# Patient Record
Sex: FEMALE | Race: WHITE | NOT HISPANIC OR LATINO | Employment: UNEMPLOYED | URBAN - METROPOLITAN AREA
[De-identification: names, ages, dates, MRNs, and addresses within clinical notes are randomized per-mention and may not be internally consistent; named-entity substitution may affect disease eponyms.]

---

## 2017-05-30 PROBLEM — N92.6 IRREGULAR MENSTRUATION: Status: ACTIVE | Noted: 2017-05-30

## 2017-12-14 PROBLEM — E78.1 HIGH BLOOD TRIGLYCERIDES: Status: ACTIVE | Noted: 2017-12-14

## 2018-04-11 PROBLEM — E55.9 VITAMIN D DEFICIENCY: Chronic | Status: ACTIVE | Noted: 2018-04-11

## 2023-05-08 DIAGNOSIS — R52 PAIN: Primary | ICD-10-CM

## 2023-05-09 ENCOUNTER — OFFICE VISIT (OUTPATIENT)
Dept: ORTHOPEDICS | Facility: CLINIC | Age: 43
End: 2023-05-09
Payer: MEDICAID

## 2023-05-09 ENCOUNTER — HOSPITAL ENCOUNTER (OUTPATIENT)
Dept: RADIOLOGY | Facility: HOSPITAL | Age: 43
Discharge: HOME | End: 2023-05-09
Attending: ORTHOPAEDIC SURGERY
Payer: MEDICAID

## 2023-05-09 VITALS
WEIGHT: 191.81 LBS | HEIGHT: 62 IN | DIASTOLIC BLOOD PRESSURE: 82 MMHG | BODY MASS INDEX: 35.3 KG/M2 | SYSTOLIC BLOOD PRESSURE: 121 MMHG | HEART RATE: 87 BPM

## 2023-05-09 DIAGNOSIS — G89.29 CHRONIC PAIN OF RIGHT KNEE: Primary | ICD-10-CM

## 2023-05-09 DIAGNOSIS — R52 PAIN: ICD-10-CM

## 2023-05-09 DIAGNOSIS — M25.561 CHRONIC PAIN OF RIGHT KNEE: Primary | ICD-10-CM

## 2023-05-09 PROCEDURE — 1159F MED LIST DOCD IN RCRD: CPT | Mod: CPTII,,, | Performed by: PHYSICIAN ASSISTANT

## 2023-05-09 PROCEDURE — 99203 OFFICE O/P NEW LOW 30 MIN: CPT | Mod: S$PBB,,, | Performed by: PHYSICIAN ASSISTANT

## 2023-05-09 PROCEDURE — 3079F DIAST BP 80-89 MM HG: CPT | Mod: CPTII,,, | Performed by: PHYSICIAN ASSISTANT

## 2023-05-09 PROCEDURE — 3074F SYST BP LT 130 MM HG: CPT | Mod: CPTII,,, | Performed by: PHYSICIAN ASSISTANT

## 2023-05-09 PROCEDURE — 73564 X-RAY EXAM KNEE 4 OR MORE: CPT | Mod: 26,RT,, | Performed by: RADIOLOGY

## 2023-05-09 PROCEDURE — 3008F PR BODY MASS INDEX (BMI) DOCUMENTED: ICD-10-PCS | Mod: CPTII,,, | Performed by: PHYSICIAN ASSISTANT

## 2023-05-09 PROCEDURE — 99213 OFFICE O/P EST LOW 20 MIN: CPT | Mod: PBBFAC,PN | Performed by: PHYSICIAN ASSISTANT

## 2023-05-09 PROCEDURE — 3008F BODY MASS INDEX DOCD: CPT | Mod: CPTII,,, | Performed by: PHYSICIAN ASSISTANT

## 2023-05-09 PROCEDURE — 99999 PR PBB SHADOW E&M-EST. PATIENT-LVL III: ICD-10-PCS | Mod: PBBFAC,,, | Performed by: PHYSICIAN ASSISTANT

## 2023-05-09 PROCEDURE — 73564 X-RAY EXAM KNEE 4 OR MORE: CPT | Mod: TC,PN,RT

## 2023-05-09 PROCEDURE — 99203 PR OFFICE/OUTPT VISIT, NEW, LEVL III, 30-44 MIN: ICD-10-PCS | Mod: S$PBB,,, | Performed by: PHYSICIAN ASSISTANT

## 2023-05-09 PROCEDURE — 3074F PR MOST RECENT SYSTOLIC BLOOD PRESSURE < 130 MM HG: ICD-10-PCS | Mod: CPTII,,, | Performed by: PHYSICIAN ASSISTANT

## 2023-05-09 PROCEDURE — 99999 PR PBB SHADOW E&M-EST. PATIENT-LVL III: CPT | Mod: PBBFAC,,, | Performed by: PHYSICIAN ASSISTANT

## 2023-05-09 PROCEDURE — 73564 XR KNEE COMP 4 OR MORE VIEWS RIGHT: ICD-10-PCS | Mod: 26,RT,, | Performed by: RADIOLOGY

## 2023-05-09 PROCEDURE — 1159F PR MEDICATION LIST DOCUMENTED IN MEDICAL RECORD: ICD-10-PCS | Mod: CPTII,,, | Performed by: PHYSICIAN ASSISTANT

## 2023-05-09 PROCEDURE — 3079F PR MOST RECENT DIASTOLIC BLOOD PRESSURE 80-89 MM HG: ICD-10-PCS | Mod: CPTII,,, | Performed by: PHYSICIAN ASSISTANT

## 2023-05-09 PROCEDURE — 1160F PR REVIEW ALL MEDS BY PRESCRIBER/CLIN PHARMACIST DOCUMENTED: ICD-10-PCS | Mod: CPTII,,, | Performed by: PHYSICIAN ASSISTANT

## 2023-05-09 PROCEDURE — 1160F RVW MEDS BY RX/DR IN RCRD: CPT | Mod: CPTII,,, | Performed by: PHYSICIAN ASSISTANT

## 2023-05-09 NOTE — PROGRESS NOTES
Subjective:      Patient ID: Marycarmen Valencia is a 42 y.o. female.    Chief Complaint: Pain of the Right Knee      42year old female presents with couple year history of right knee pain. No injury. Pain is medially and posteriorly. Worse with flexion, sleeping, driving and walking. Notes popping and locking sensation. Has tried  tylenol and nsaids without any relief. Had corticosteroid injections in the past which only helped for about 3 mon. Has not tried formal PT. States her PCP ordered a MRI that showed a meniscus tear. She does not have a disc or MRI report with her today.       Review of Systems   Constitutional: Negative for chills and fever.   Cardiovascular:  Negative for chest pain.   Respiratory:  Negative for cough.    Hematologic/Lymphatic: Does not bruise/bleed easily.   Skin:  Negative for poor wound healing and rash.   Musculoskeletal:  Positive for joint pain, myalgias and stiffness.   Gastrointestinal:  Negative for abdominal pain.   Genitourinary:  Negative for bladder incontinence.   Neurological:  Negative for dizziness, loss of balance and weakness.   Psychiatric/Behavioral:  Negative for altered mental status.      Review of patient's allergies indicates:   Allergen Reactions    Amoxicillin Other (See Comments)        Current Outpatient Medications   Medication Sig Dispense Refill    cabergoline (DOSTINEX) 0.5 mg tablet TAKE 1/2 TABLET(0.25 MG TOTAL) BY MOUTH TWICE A WEEK 8 tablet 0    levothyroxine (SYNTHROID) 112 MCG tablet Take 2 tablets (224 mcg total) by mouth before breakfast. This is a dose decrease from 250 mcg po qam. Patient requests SULLY 60 tablet 11    omega 3-dha-epa-fish oil (FISH OIL) 1,000 mg (120 mg-180 mg) Cap Take 2 pills twice a day of 1000 mg each.      venlafaxine (EFFEXOR) 75 MG tablet Take 2 tablets (150 mg total) by mouth Daily. 90 tablet 3     No current facility-administered medications for this visit.        The patient's relevant past medical, surgical,  "and social history was reviewed in Epic.       Objective:      VITAL SIGNS: /82   Pulse 87   Ht 5' 2" (1.575 m)   Wt 87 kg (191 lb 12.8 oz)   BMI 35.08 kg/m²     General    Nursing note and vitals reviewed.  Constitutional: She is oriented to person, place, and time. She appears well-developed and well-nourished.   Neurological: She is alert and oriented to person, place, and time.     General Musculoskeletal Exam   Gait: normal       Right Knee Exam     Tenderness   The patient is tender to palpation of the medial joint line.    Range of Motion   Extension:  5   Flexion:  130     Tests   Meniscus   Carissa:  Medial - negative   Ligament Examination   Lachman: normal (-1 to 2mm)   MCL - Valgus: normal (0 to 2mm)  LCL - Varus: normal    Other   Sensation: normal    Left Knee Exam     Range of Motion   Extension:  5   Flexion:  130     Muscle Strength   Right Lower Extremity   Quadriceps:  4/5   Left Lower Extremity   Quadriceps:  4/5      Radiographs taken today of the right knee and reviewed by me demonstrate no acute fractures or dislocations. There is mild degenerative changes noted in the patellofemoral compartment. There is no soft tissue swelling.       Assessment:       1. Chronic pain of right knee          Plan:         Marycarmen was seen today for pain.    Diagnoses and all orders for this visit:    Chronic pain of right knee  -     Ambulatory referral/consult to Physical/Occupational Therapy; Future    Right knee pain, possible meniscus tear. Does not have MRI with her today. I recommend trying a complete formal course of PT first. If no relief, will have her come back to follow up with sports med physician after PT is complete. Bring the MRI disc and report. Continue OTC meds as needed for pain.     Diagnoses and plan discussed with the patient, as well as the expected course and duration of his symptoms.  All questions and concerns were addressed prior to the end of the visit.   Instructed " patient to call office if they have any future questions/concerns or to schedule apt. Patient will return to see me if symptoms worsen or fail to improve    Note dictated with voice recognition software, please excuse any grammatical errors.        Avelina Worthington PA-C   05/09/2023

## 2023-08-22 ENCOUNTER — PATIENT MESSAGE (OUTPATIENT)
Dept: ORTHOPEDICS | Facility: CLINIC | Age: 43
End: 2023-08-22
Payer: COMMERCIAL

## 2023-09-08 ENCOUNTER — OFFICE VISIT (OUTPATIENT)
Dept: ORTHOPEDICS | Facility: CLINIC | Age: 43
End: 2023-09-08
Payer: COMMERCIAL

## 2023-09-08 ENCOUNTER — TELEPHONE (OUTPATIENT)
Dept: ORTHOPEDICS | Facility: CLINIC | Age: 43
End: 2023-09-08
Payer: COMMERCIAL

## 2023-09-08 VITALS
HEIGHT: 62 IN | HEART RATE: 80 BPM | SYSTOLIC BLOOD PRESSURE: 126 MMHG | DIASTOLIC BLOOD PRESSURE: 82 MMHG | WEIGHT: 203.94 LBS | BODY MASS INDEX: 37.53 KG/M2

## 2023-09-08 DIAGNOSIS — M25.561 ACUTE PAIN OF RIGHT KNEE: ICD-10-CM

## 2023-09-08 DIAGNOSIS — M17.11 PATELLOFEMORAL ARTHRITIS OF RIGHT KNEE: Primary | ICD-10-CM

## 2023-09-08 PROCEDURE — 99213 OFFICE O/P EST LOW 20 MIN: CPT | Mod: S$GLB,,, | Performed by: PHYSICIAN ASSISTANT

## 2023-09-08 PROCEDURE — 3008F BODY MASS INDEX DOCD: CPT | Mod: CPTII,S$GLB,, | Performed by: PHYSICIAN ASSISTANT

## 2023-09-08 PROCEDURE — 3079F DIAST BP 80-89 MM HG: CPT | Mod: CPTII,S$GLB,, | Performed by: PHYSICIAN ASSISTANT

## 2023-09-08 PROCEDURE — 99999 PR PBB SHADOW E&M-EST. PATIENT-LVL IV: CPT | Mod: PBBFAC,,, | Performed by: PHYSICIAN ASSISTANT

## 2023-09-08 PROCEDURE — 99999 PR PBB SHADOW E&M-EST. PATIENT-LVL IV: ICD-10-PCS | Mod: PBBFAC,,, | Performed by: PHYSICIAN ASSISTANT

## 2023-09-08 PROCEDURE — 3008F PR BODY MASS INDEX (BMI) DOCUMENTED: ICD-10-PCS | Mod: CPTII,S$GLB,, | Performed by: PHYSICIAN ASSISTANT

## 2023-09-08 PROCEDURE — 1159F MED LIST DOCD IN RCRD: CPT | Mod: CPTII,S$GLB,, | Performed by: PHYSICIAN ASSISTANT

## 2023-09-08 PROCEDURE — 3079F PR MOST RECENT DIASTOLIC BLOOD PRESSURE 80-89 MM HG: ICD-10-PCS | Mod: CPTII,S$GLB,, | Performed by: PHYSICIAN ASSISTANT

## 2023-09-08 PROCEDURE — 3074F PR MOST RECENT SYSTOLIC BLOOD PRESSURE < 130 MM HG: ICD-10-PCS | Mod: CPTII,S$GLB,, | Performed by: PHYSICIAN ASSISTANT

## 2023-09-08 PROCEDURE — 99213 PR OFFICE/OUTPT VISIT, EST, LEVL III, 20-29 MIN: ICD-10-PCS | Mod: S$GLB,,, | Performed by: PHYSICIAN ASSISTANT

## 2023-09-08 PROCEDURE — 1160F RVW MEDS BY RX/DR IN RCRD: CPT | Mod: CPTII,S$GLB,, | Performed by: PHYSICIAN ASSISTANT

## 2023-09-08 PROCEDURE — 3074F SYST BP LT 130 MM HG: CPT | Mod: CPTII,S$GLB,, | Performed by: PHYSICIAN ASSISTANT

## 2023-09-08 PROCEDURE — 1159F PR MEDICATION LIST DOCUMENTED IN MEDICAL RECORD: ICD-10-PCS | Mod: CPTII,S$GLB,, | Performed by: PHYSICIAN ASSISTANT

## 2023-09-08 PROCEDURE — 1160F PR REVIEW ALL MEDS BY PRESCRIBER/CLIN PHARMACIST DOCUMENTED: ICD-10-PCS | Mod: CPTII,S$GLB,, | Performed by: PHYSICIAN ASSISTANT

## 2023-09-08 RX ORDER — MELOXICAM 7.5 MG/1
7.5 TABLET ORAL DAILY
Qty: 30 TABLET | Refills: 2 | Status: SHIPPED | OUTPATIENT
Start: 2023-09-08

## 2023-09-08 NOTE — TELEPHONE ENCOUNTER
Per HW, this patient had MRI done elsewhere when I saw her in may. she did not have report or disc with her. I cannot see her today if she does not have the report or disc Left voice mail to bring in CD and report today.

## 2023-09-08 NOTE — PROGRESS NOTES
"Subjective:      Patient ID: Marycarmen Valencia is a 43 y.o. female.    Chief Complaint: Pain of the Right Knee      43-year-old female follow-up right knee pain.  Patient has her MRI today and report.  She did not go to physical therapy due to work schedule.  She states her pain is around the patella and medially.  Notes popping sensation.  Unable to kneel.        Review of Systems   Constitutional: Negative for chills and fever.   Cardiovascular:  Negative for chest pain.   Respiratory:  Negative for cough.    Hematologic/Lymphatic: Does not bruise/bleed easily.   Skin:  Negative for poor wound healing and rash.   Musculoskeletal:  Positive for joint pain, myalgias and stiffness.   Gastrointestinal:  Negative for abdominal pain.   Genitourinary:  Negative for bladder incontinence.   Neurological:  Negative for dizziness, loss of balance and weakness.   Psychiatric/Behavioral:  Negative for altered mental status.        Review of patient's allergies indicates:   Allergen Reactions    Amoxicillin Other (See Comments)        Current Outpatient Medications   Medication Sig Dispense Refill    cabergoline (DOSTINEX) 0.5 mg tablet TAKE 1/2 TABLET(0.25 MG TOTAL) BY MOUTH TWICE A WEEK 8 tablet 0    levothyroxine (SYNTHROID) 112 MCG tablet Take 2 tablets (224 mcg total) by mouth before breakfast. This is a dose decrease from 250 mcg po qam. Patient requests SULLY 60 tablet 11    meloxicam (MOBIC) 7.5 MG tablet Take 1 tablet (7.5 mg total) by mouth once daily. 30 tablet 2    omega 3-dha-epa-fish oil (FISH OIL) 1,000 mg (120 mg-180 mg) Cap Take 2 pills twice a day of 1000 mg each.      venlafaxine (EFFEXOR) 75 MG tablet Take 2 tablets (150 mg total) by mouth Daily. 90 tablet 3     No current facility-administered medications for this visit.        The patient's relevant past medical, surgical, and social history was reviewed in Epic.       Objective:      VITAL SIGNS: /82   Pulse 80   Ht 5' 2" (1.575 m)   Wt " 92.5 kg (203 lb 14.8 oz)   BMI 37.30 kg/m²               Right Knee Exam     Tenderness   The patient is tender to palpation of the patella and medial joint line.    Range of Motion   Extension:  5   Flexion:  130     Tests   Meniscus   Carissa:  Medial - negative   Ligament Examination   Lachman: normal (-1 to 2mm)     Muscle Strength   Right Lower Extremity   Quadriceps:  4/5      Right knee MRI done on 7/26/22 reveals  1. Advanced degenerative chondromalacia of the patellofemoral compartment with mild degenerative chondromalacia of the medial and lateral joint compartments.  2. Multiloculated cysts are noted anterior to the anterior horn of the medial meniscus.  This may be secondary to tearing of the anterior aspect of the lateral meniscus near the transverse ligament.  3. Some bruising is noted at the root of the posterior horn of the medial meniscus.    Previous right knee xrays reveal mild degenerative changes. No fractures or dislocations. No lesions. KL2       Assessment:       1. Patellofemoral arthritis of right knee    2. Acute pain of right knee          Plan:         Marycarmen was seen today for pain.    Diagnoses and all orders for this visit:    Patellofemoral arthritis of right knee  -     Prior authorization Order  -     Ambulatory referral/consult to Physical/Occupational Therapy; Future  -     meloxicam (MOBIC) 7.5 MG tablet; Take 1 tablet (7.5 mg total) by mouth once daily.    Acute pain of right knee  -     Prior authorization Order  -     Ambulatory referral/consult to Physical/Occupational Therapy; Future  -     meloxicam (MOBIC) 7.5 MG tablet; Take 1 tablet (7.5 mg total) by mouth once daily.    MRI does not reveal meniscus tear.  She does have significant chondromalacia of the patellofemoral compartment.  I believe she will greatly benefit from a gel HA injection at this time.  Will order Synvisc-One.  Return once approved.  Meloxicam given today to help with pain.  Physical therapy also  ordered but unsure if patient will complete this.    Diagnoses and plan discussed with the patient, as well as the expected course and duration of his symptoms.  All questions and concerns were addressed prior to the end of the visit.   Instructed patient to call office if they have any future questions/concerns or to schedule apt. Patient will return to see me if symptoms worsen or fail to improve    Note dictated with voice recognition software, please excuse any grammatical errors.        Avelina Worthington PA-C   09/08/2023

## 2023-09-18 ENCOUNTER — OFFICE VISIT (OUTPATIENT)
Dept: ORTHOPEDICS | Facility: CLINIC | Age: 43
End: 2023-09-18
Payer: COMMERCIAL

## 2023-09-18 VITALS
HEIGHT: 62 IN | DIASTOLIC BLOOD PRESSURE: 74 MMHG | SYSTOLIC BLOOD PRESSURE: 108 MMHG | BODY MASS INDEX: 37.04 KG/M2 | WEIGHT: 201.25 LBS | HEART RATE: 65 BPM

## 2023-09-18 DIAGNOSIS — G89.29 CHRONIC PAIN OF RIGHT KNEE: ICD-10-CM

## 2023-09-18 DIAGNOSIS — M25.561 CHRONIC PAIN OF RIGHT KNEE: ICD-10-CM

## 2023-09-18 DIAGNOSIS — M17.11 PATELLOFEMORAL ARTHRITIS OF RIGHT KNEE: Primary | ICD-10-CM

## 2023-09-18 PROCEDURE — 99499 NO LOS: ICD-10-PCS | Mod: S$GLB,,, | Performed by: PHYSICIAN ASSISTANT

## 2023-09-18 PROCEDURE — 20610 LARGE JOINT ASPIRATION/INJECTION: R KNEE: ICD-10-PCS | Mod: RT,S$GLB,, | Performed by: PHYSICIAN ASSISTANT

## 2023-09-18 PROCEDURE — 99999 PR PBB SHADOW E&M-EST. PATIENT-LVL III: ICD-10-PCS | Mod: PBBFAC,,, | Performed by: PHYSICIAN ASSISTANT

## 2023-09-18 PROCEDURE — 99499 UNLISTED E&M SERVICE: CPT | Mod: S$GLB,,, | Performed by: PHYSICIAN ASSISTANT

## 2023-09-18 PROCEDURE — 99999 PR PBB SHADOW E&M-EST. PATIENT-LVL III: CPT | Mod: PBBFAC,,, | Performed by: PHYSICIAN ASSISTANT

## 2023-09-18 PROCEDURE — 20610 DRAIN/INJ JOINT/BURSA W/O US: CPT | Mod: RT,S$GLB,, | Performed by: PHYSICIAN ASSISTANT

## 2023-09-18 NOTE — PROCEDURES
Large Joint Aspiration/Injection: R knee    Date/Time: 9/18/2023 1:30 PM    Performed by: Avelina Worthington PA-C  Authorized by: Avelina Worthington PA-C    Consent Done?:  Yes (Verbal)  Indications:  Arthritis and pain  Site marked: the procedure site was marked    Timeout: prior to procedure the correct patient, procedure, and site was verified    Local anesthesia used?: No    Local anesthetic:  Topical anesthetic    Details:  Needle Size:  21 G  Approach:  Lateral  Location:  Knee  Site:  R knee  Medications:  48 mg hylan g-f 20 48 mg/6 mL  Patient tolerance:  Patient tolerated the procedure well with no immediate complications

## 2023-09-18 NOTE — PROGRESS NOTES
"Subjective:      Patient ID: Marycarmen Valencia is a 43 y.o. female.    Chief Complaint: Pain and Injections of the Right Knee      Patient is here for First Synvisc One injection(s) for right knee osteoarthritis. Patient tolerated last injection well. No new complaints today. Starts PT next month           Review of Systems   Constitutional: Negative for chills and fever.   Cardiovascular:  Negative for chest pain.   Respiratory:  Negative for cough.    Hematologic/Lymphatic: Does not bruise/bleed easily.   Skin:  Negative for poor wound healing and rash.   Musculoskeletal:  Positive for joint pain, myalgias and stiffness.   Gastrointestinal:  Negative for abdominal pain.   Genitourinary:  Negative for bladder incontinence.   Neurological:  Negative for dizziness, loss of balance and weakness.   Psychiatric/Behavioral:  Negative for altered mental status.        Review of patient's allergies indicates:   Allergen Reactions    Amoxicillin Other (See Comments)        Current Outpatient Medications   Medication Sig Dispense Refill    cabergoline (DOSTINEX) 0.5 mg tablet TAKE 1/2 TABLET(0.25 MG TOTAL) BY MOUTH TWICE A WEEK 8 tablet 0    meloxicam (MOBIC) 7.5 MG tablet Take 1 tablet (7.5 mg total) by mouth once daily. 30 tablet 2    levothyroxine (SYNTHROID) 112 MCG tablet Take 2 tablets (224 mcg total) by mouth before breakfast. This is a dose decrease from 250 mcg po qam. Patient requests SULLY 60 tablet 11    omega 3-dha-epa-fish oil (FISH OIL) 1,000 mg (120 mg-180 mg) Cap Take 2 pills twice a day of 1000 mg each.      venlafaxine (EFFEXOR) 75 MG tablet Take 2 tablets (150 mg total) by mouth Daily. 90 tablet 3     No current facility-administered medications for this visit.        The patient's relevant past medical, surgical, and social history was reviewed in Epic.       Objective:      VITAL SIGNS: /74   Pulse 65   Ht 5' 2" (1.575 m)   Wt 91.3 kg (201 lb 4.5 oz)   BMI 36.81 kg/m²     Ortho/SPM Exam "         Assessment:       1. Patellofemoral arthritis of right knee    2. Chronic pain of right knee          Plan:         Marycarmen was seen today for pain and injections.    Diagnoses and all orders for this visit:    Patellofemoral arthritis of right knee  -     Large Joint Aspiration/Injection: R knee    Chronic pain of right knee  -     Large Joint Aspiration/Injection: R knee    I injected the right knee with one dose of Synvisc One today.  We will see the patient back in 6 mos or as needed.      Diagnoses and plan discussed with the patient, as well as the expected course and duration of his symptoms.  All questions and concerns were addressed prior to the end of the visit.   Instructed patient to call office if they have any future questions/concerns or to schedule apt. Patient will return to see me if symptoms worsen or fail to improve    Note dictated with voice recognition software, please excuse any grammatical errors.        Avelina Worthington PA-C   09/18/2023

## 2023-10-05 ENCOUNTER — DOCUMENTATION ONLY (OUTPATIENT)
Dept: REHABILITATION | Facility: HOSPITAL | Age: 43
End: 2023-10-05